# Patient Record
Sex: FEMALE | Race: BLACK OR AFRICAN AMERICAN | NOT HISPANIC OR LATINO
[De-identification: names, ages, dates, MRNs, and addresses within clinical notes are randomized per-mention and may not be internally consistent; named-entity substitution may affect disease eponyms.]

---

## 2020-03-23 ENCOUNTER — APPOINTMENT (OUTPATIENT)
Dept: ORTHOPEDIC SURGERY | Facility: CLINIC | Age: 25
End: 2020-03-23

## 2020-04-23 ENCOUNTER — APPOINTMENT (OUTPATIENT)
Dept: ORTHOPEDIC SURGERY | Facility: CLINIC | Age: 25
End: 2020-04-23
Payer: COMMERCIAL

## 2020-04-23 VITALS
WEIGHT: 136 LBS | DIASTOLIC BLOOD PRESSURE: 88 MMHG | HEIGHT: 61 IN | TEMPERATURE: 98 F | BODY MASS INDEX: 25.68 KG/M2 | SYSTOLIC BLOOD PRESSURE: 125 MMHG | HEART RATE: 80 BPM

## 2020-04-23 DIAGNOSIS — G89.29 LOW BACK PAIN: ICD-10-CM

## 2020-04-23 DIAGNOSIS — F43.10 POST-TRAUMATIC STRESS DISORDER, UNSPECIFIED: ICD-10-CM

## 2020-04-23 DIAGNOSIS — Z87.09 PERSONAL HISTORY OF OTHER DISEASES OF THE RESPIRATORY SYSTEM: ICD-10-CM

## 2020-04-23 DIAGNOSIS — M54.5 LOW BACK PAIN: ICD-10-CM

## 2020-04-23 DIAGNOSIS — M41.125 ADOLESCENT IDIOPATHIC SCOLIOSIS, THORACOLUMBAR REGION: ICD-10-CM

## 2020-04-23 DIAGNOSIS — F41.8 OTHER SPECIFIED ANXIETY DISORDERS: ICD-10-CM

## 2020-04-23 PROCEDURE — 72100 X-RAY EXAM L-S SPINE 2/3 VWS: CPT

## 2020-04-23 PROCEDURE — 99204 OFFICE O/P NEW MOD 45 MIN: CPT

## 2020-04-23 RX ORDER — IBUPROFEN 800 MG/1
800 TABLET, FILM COATED ORAL
Qty: 90 | Refills: 0 | Status: ACTIVE | COMMUNITY
Start: 2020-04-23 | End: 1900-01-01

## 2020-04-24 PROBLEM — M41.125 ADOLESCENT IDIOPATHIC SCOLIOSIS OF THORACOLUMBAR REGION: Status: ACTIVE | Noted: 2020-04-24

## 2020-04-24 PROBLEM — Z87.09 HISTORY OF ASTHMA: Status: RESOLVED | Noted: 2020-04-24 | Resolved: 2020-04-24

## 2020-04-24 PROBLEM — F43.10 PTSD (POST-TRAUMATIC STRESS DISORDER): Status: ACTIVE | Noted: 2020-04-24

## 2020-04-24 PROBLEM — F41.8 DEPRESSION WITH ANXIETY: Status: ACTIVE | Noted: 2020-04-24

## 2020-04-24 NOTE — PHYSICAL EXAM
[de-identified] : She is fully alert and oriented with a normal mood and affect.  She is self manipulating her back as I take the history.  She is in no acute distress.  She ambulates with a normal gait including tiptoe and heel walking.  There are no cutaneous abnormalities or palpable bony defects of the spine.  There is no paravertebral muscle spasm or trochanteric tenderness but there is a trace of sciatic notch sensitivity bilaterally.  Forward flexion of the spine reveals a small right thoracic and thoracolumbar paravertebral prominence secondary to scoliosis.  There is no evidence of shortness of breath or respiratory distress.  Her lower extremity neurological examination revealed 2+ symmetrical reflexes with normal motor power and normal sensation.  Straight leg raising is negative to 90 degrees in the sitting position bilaterally.  Her hips and her knees have a full range of motion with normal stability.  Vascular examination shows no evidence of varicosities and there is no lymphedema.  There are no cutaneous abnormalities of the upper extremities or the lower extremities.  Her upper extremities are normal to inspection and her elbows have a full range of motion with normal motor power and normal stability. [de-identified] : AP and lateral x-rays of the lumbar spine reveal normal sagittal alignment.  There are no destructive changes.  There is a mild scoliosis.  Disc heights are well-maintained.

## 2020-04-24 NOTE — DISCUSSION/SUMMARY
[Medication Risks Reviewed] : Medication risks reviewed [de-identified] : I have recommended rest and moist heat.  We discussed the necessity to stop the chronic self manipulation of her spine.  She has been started on ibuprofen 800 mg 3 times a day as a nonsteroidal anti-inflammatory and I will see her for follow-up in 3-1/2 weeks.  She will call if there are problems with the medication or worsening of her symptoms.

## 2020-04-24 NOTE — HISTORY OF PRESENT ILLNESS
[de-identified] : This 24-year-old woman is seen in the office today along with her mother.  Her first episode of low back pain was in 2015 and lasting for approximately a month.  She has had intermittent symptoms of minor lower back pain since then but increasing lower back pain over the last 4-month.  She has not had associated leg pain or associated neurologic symptoms of numbness, paresthesias or weakness.  The pain is occasionally worse coughing, sneezing and forcing to move her bowels.  She has had night pain.  The pain can be aggravated by sitting and standing.  Treatment has been Tylenol and ibuprofen up to 400 mg twice a day.  She is a chronic self manipulate her of her spine.  She is on medication for PTSD with depression and anxiety.  She is allergic to codeine and has a history of asthma. [Pain Location] : pain [Worsening] : worsening [8] : a maximum pain level of 8/10 [Walking] : walking [Standing] : standing [Sitting] : sitting

## 2022-09-15 ENCOUNTER — EMERGENCY (EMERGENCY)
Facility: HOSPITAL | Age: 27
LOS: 1 days | Discharge: HOME | End: 2022-09-15
Attending: STUDENT IN AN ORGANIZED HEALTH CARE EDUCATION/TRAINING PROGRAM
Payer: SELF-PAY

## 2022-09-15 VITALS
RESPIRATION RATE: 20 BRPM | HEART RATE: 87 BPM | TEMPERATURE: 98 F | OXYGEN SATURATION: 99 % | DIASTOLIC BLOOD PRESSURE: 87 MMHG | HEIGHT: 62 IN | SYSTOLIC BLOOD PRESSURE: 118 MMHG | WEIGHT: 119.93 LBS

## 2022-09-15 PROCEDURE — 99284 EMERGENCY DEPT VISIT MOD MDM: CPT | Mod: 25

## 2022-09-15 PROCEDURE — 73080 X-RAY EXAM OF ELBOW: CPT

## 2022-09-15 PROCEDURE — 73130 X-RAY EXAM OF HAND: CPT | Mod: 26,LT

## 2022-09-15 PROCEDURE — 73130 X-RAY EXAM OF HAND: CPT

## 2022-09-15 PROCEDURE — 73110 X-RAY EXAM OF WRIST: CPT | Mod: 26,LT

## 2022-09-15 PROCEDURE — 99284 EMERGENCY DEPT VISIT MOD MDM: CPT

## 2022-09-15 PROCEDURE — 73110 X-RAY EXAM OF WRIST: CPT

## 2022-09-15 PROCEDURE — 73090 X-RAY EXAM OF FOREARM: CPT

## 2022-09-15 PROCEDURE — 73080 X-RAY EXAM OF ELBOW: CPT | Mod: 26,LT

## 2022-09-15 PROCEDURE — 73090 X-RAY EXAM OF FOREARM: CPT | Mod: 26,LT

## 2022-09-15 PROCEDURE — 82962 GLUCOSE BLOOD TEST: CPT

## 2022-09-15 RX ORDER — ACETAMINOPHEN 500 MG
975 TABLET ORAL ONCE
Refills: 0 | Status: COMPLETED | OUTPATIENT
Start: 2022-09-15 | End: 2022-09-15

## 2022-09-15 RX ORDER — IBUPROFEN 200 MG
400 TABLET ORAL ONCE
Refills: 0 | Status: COMPLETED | OUTPATIENT
Start: 2022-09-15 | End: 2022-09-15

## 2022-09-15 RX ADMIN — Medication 400 MILLIGRAM(S): at 16:38

## 2022-09-15 RX ADMIN — Medication 975 MILLIGRAM(S): at 18:10

## 2022-09-15 RX ADMIN — Medication 975 MILLIGRAM(S): at 16:38

## 2022-09-15 RX ADMIN — Medication 400 MILLIGRAM(S): at 18:10

## 2022-09-15 NOTE — ED PROVIDER NOTE - NEUROLOGICAL, MLM
Alert and oriented, no focal deficits, no motor or sensory deficits. Radial, median, ulnar fxn intact to LUE

## 2022-09-15 NOTE — ED PROVIDER NOTE - NS ED ATTENDING STATEMENT MOD
This was a shared visit with the NATHALIA. I reviewed and verified the documentation and independently performed the documented:

## 2022-09-15 NOTE — ED PROVIDER NOTE - OBJECTIVE STATEMENT
27y F no reported pmhx p/w arm pain after fall. pt reports fall off ladder about 2hrs ago while standing on a ladder at home (estimates 4 or 5 steps up) pt felt lightheaded and fell onto her left arm. denies head strike or LOC. denies previous episodes of lightheadedness, pt notes not eating breakfast today. reports aching pain to left mid-forearm, no otc meds taken prior to arrival. right-hand dominant. denies preceding CP, SOB, or palpitations. Denies CP, SOB, numbness, tingling, weakness, HA, neck pain, NV 27y F no reported pmhx p/w arm pain after fall. pt reports fall off ladder about 2hrs ago while standing on a ladder at home (estimates 4 or 5 steps up) pt felt lightheaded and fell onto her left arm. denies head strike or LOC. denies previous episodes of lightheadedness, pt notes not eating breakfast or lunch today. reports aching pain to left mid-forearm, no otc meds taken prior to arrival. right-hand dominant. denies preceding CP, SOB, or palpitations. Denies CP, SOB, numbness, tingling, weakness, HA, neck pain, NV

## 2022-09-15 NOTE — ED PROVIDER NOTE - NSFOLLOWUPINSTRUCTIONS_ED_ALL_ED_FT
Please follow up with your primary care doctor within 1 week.  *Bring all printed lab/test results to your appointment(s).*    Take ibuprofen 400-600mg every 6 hrs as needed for pain. Take with food  Take acetaminophen 500-1000mg every 6 hrs as needed for pain. DO NOT EXCEED 4000mg DAILY.    Keep splint on until followup with your doctor    Return to the ED for worsening pain, numbness, tingling, weakness, or any other concerns. Please follow up with your primary care doctor within 1 week.  *Bring all printed lab/test results to your appointment(s).*    Take ibuprofen 400-600mg every 6 hrs as needed for pain. Take with food  Take acetaminophen 500-1000mg every 6 hrs as needed for pain. DO NOT EXCEED 4000mg DAILY.    Return to the ED for worsening pain, numbness, tingling, weakness, or any other concerns.

## 2022-09-15 NOTE — ED PROVIDER NOTE - PATIENT PORTAL LINK FT
You can access the FollowMyHealth Patient Portal offered by [facility] by registering at the following website: [email or web address]. By joining [facility]’s FollowMyHealth portal, you will also be able to view your health information using other applications (apps) compatible with our system.

## 2022-09-15 NOTE — ED PROVIDER NOTE - ATTENDING APP SHARED VISIT CONTRIBUTION OF CARE
I, Ashwin Escobedo, performed a history and physical exam of the patient and discussed their management with the resident and/or advanced care provider. I reviewed the resident and/or advanced care provider's note and agree with the documented findings and plan of care except where noted. I was present and available for all procedures.     agree with above. I wrote mdm

## 2022-09-15 NOTE — ED ADULT TRIAGE NOTE - CHIEF COMPLAINT QUOTE
Pt presents to ED after falling off a ladder at work and landing on her left arm where she now c/o pain.

## 2022-09-15 NOTE — ED PROVIDER NOTE - CLINICAL SUMMARY MEDICAL DECISION MAKING FREE TEXT BOX
Ashwin Escobedo MD. pt presents s/p fall off ladder about 4-5 steps onto left side, c/o left forearm/wrist pain. no head strike or loc. pt states she did feel lightheaded prior to falling but not now. pt does state that she did not eat breakfast or lunch, which is likely why pt felt lightheaded. she denies antecedent cp, palpitations, syncope. mitra family hx of sudden cardiac death. denies numbness, weakness, tingling,c p, sob now. pt hd stable. has b/l bs with equal symemtric chest rise. abd is soft nt nd. no midline vertebral tenderness or bony pelvis tenderness. ambulatory in the ed. pt's left arm: midforearm is tender to touch without deformity. pt has mild wrist tenderness without point tenderness and has FROM of the shoulder, elbow, and wrist. no bony hand tenderness. compartments are soft. neurovasc intact. will provide analgesia, obtain x-rays to eval for fx, reassess

## 2022-09-15 NOTE — ED PROVIDER NOTE - PROGRESS NOTE DETAILS
Physical Therapy Visit    Referred by: Brittany Nix MD; Medical Diagnosis (from order):    Diagnosis Information      Diagnosis    737.30 (ICD-9-CM) - M41.9 (ICD-10-CM) - Scoliosis, unspecified scoliosis type, unspecified spinal region              Visit: 2    Visit Type: Daily Treatment Note  Next referring provider appointment: 11/18/2022        SUBJECTIVE                                                                                                               I am less sore with the exercises.  I had some pain using the heel lift for 1-2 hours forabout a week, but I stopped using it.  I feel the exercises are helping because I am having less pain at work.  Pain / Symptoms:  Pain rating (out of 10): Current: 0     OBJECTIVE                                                                                                                        TREATMENT                                                                                                                  Neuromuscular Re-Education:  Side-lying left hip abduction 10 x 2  Bridging 10 x 2  Supine straight leg raise with abdominal contraction 10 x 2 bilateral  Side plank, hips and knees flexed  10 x 1     Perform strengthening every-other-day or 3 days per week-build to a goal of 3 sets       Skilled input: verbal instruction/cues and tactile instruction/cues       ASSESSMENT                                                                                                             2nd visit, this patient returns for review in advancement of her exercise program related to her back pain with thoracolumbar scoliosis.  She reports she is less sore performing the exercises.  She demonstrates independence in her exercises and performed 2 sets of 10.  She will add side plank to home program.  She states that using the heel lift on the left side It did increase her pain she will abstain from using this for 2 more weeks and then retry if she continues to note  pain she will decrease the height of the heel lift.  She will return in 3 weeks for review in advancement of her exercise program which she agrees to.         Therapy procedure time and total treatment time can be found documented on the Time Entry flowsheet   no acute pathology on xrs. pain improved. Will dc with follow up. Discussed plan and return precautions with patient who understands and agrees. All questions answered. - Javier Sierra PA-C

## 2022-09-15 NOTE — ED ADULT NURSE NOTE - OBJECTIVE STATEMENT
28 y/o Female no significant pmh presenting to ED c/o left sided arm pain s/p fall this am while standing on a ladder approx on the 4th or 5th step when she got onset of lightheadedness and fell landing onto her l. arm, denies any head injury or loc, no other associated pain, discomfort, injuries or concerns, denies any numbness, tingling, weakness, dec sensation, cp, sob, dizziness, or blurred vision. VS stable. safety maintained, pending xray.

## 2022-09-15 NOTE — ED PROVIDER NOTE - UPPER EXTREMITY EXAM, LEFT
ttp over distal 3rd of radius and ulna, no obvious deformity/TENDERNESS ttp over distal 3rd of radius and ulna, no obvious deformity. no scaphoid tenderness/TENDERNESS
